# Patient Record
(demographics unavailable — no encounter records)

---

## 2024-10-23 NOTE — HISTORY OF PRESENT ILLNESS
[FreeTextEntry8] : patient presents as over last few weeks has been experiencing unexplained vomiting. there has been no change in diet and medication, patient has been pretty calm with no stress at all. patient also will feel nauseous at times. Patient also experiences dizziness at times and blood pressure has been low when taking it at home. 110/59 was taken at home and patient considers that low. 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 he ate wendys and 5 mins before the test he vomited last friday he went to get food with his friends and he had Portuguese food and he ate the same food as them and his stomach wasnt feeling great and he had nausea, he went for a walk and he felt naseous but didnt vomit  denies blood he vomited again monday after brushing his teeth denies abdominal pain , diarrhea or constipation, fevers or chills  he has more nausea than vomiting  he has felt calm during exams   he stopped going to the gym bc he doesnt know if stress from gym is causing cortisol to be elevated he stopped protein powder and creatine

## 2024-10-23 NOTE — HISTORY OF PRESENT ILLNESS
[FreeTextEntry8] : patient presents as over last few weeks has been experiencing unexplained vomiting. there has been no change in diet and medication, patient has been pretty calm with no stress at all. patient also will feel nauseous at times. Patient also experiences dizziness at times and blood pressure has been low when taking it at home. 110/59 was taken at home and patient considers that low. 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 he ate wendys and 5 mins before the test he vomited last friday he went to get food with his friends and he had [place]ien food and he ate the same food as them and his stomach wasnt feeling great and he had nausea, he went for a walk and he felt naseous but didnt vomit  denies blood he vomited again monday after brushing his teeth denies abdominal pain , diarrhea or constipation, fevers or chills  he has more nausea than vomiting  he has felt calm during exams   he stopped going to the gym bc he doesnt know if stress from gym is causing cortisol to be elevated he stopped protein powder and creatine

## 2024-10-23 NOTE — REVIEW OF SYSTEMS
[Fever] : no fever [Chills] : no chills [Recent Change In Weight] : ~T no recent weight change [Abdominal Pain] : no abdominal pain [Nausea] : nausea [Constipation] : no constipation [Diarrhea] : no diarrhea [Vomiting] : vomiting [Heartburn] : no heartburn [Melena] : no melena

## 2024-11-25 NOTE — HISTORY OF PRESENT ILLNESS
[FreeTextEntry8] : Patient with worsening throat pain, difficulty swallowing, enlarged glands, exudates on tonsils x 5 days.  Patient went to Urgent care twice. First time he was given zpack and took for 3 days. Second time he was given doxycycline and prednisone which he is started yesterday. He feels like medications not helping. Also taking Tylenol and ibuprofen with no relief.  No testing for strep was performed.

## 2024-11-25 NOTE — PHYSICAL EXAM
[No Acute Distress] : no acute distress [Ill-Appearing] : ill-appearing [Normal] : normal rate, regular rhythm, normal S1 and S2 and no murmur heard [Normal Affect] : the affect was normal [Alert and Oriented x3] : oriented to person, place, and time [Normal Insight/Judgement] : insight and judgment were intact [de-identified] : +bilateral tonsil swelling, erythema. +right tonsil exudates . +enlarged tonsillar glands

## 2024-11-25 NOTE — ASSESSMENT
[FreeTextEntry1] : Tonsilitis possible secondary to either mono or streptococcus. Rapid strep is negative. Strep PCR obtained. COVID/FLU RSV swab obtained. CBC and Kathy barre antibodies to r/u acute mono. Will switch doxy to clarithromycin for now. Reports severe allergy to PCN. May ube Tylenol or ibuprofen for pain/swelling. Follow up in 7 days. Advised patient if he has any severe difficulty swallowing, drooling, difficulty breathing then go to ER.

## 2024-11-25 NOTE — REVIEW OF SYSTEMS
[Fever] : no fever [Fatigue] : fatigue [Sore Throat] : sore throat [Chest Pain] : no chest pain [Palpitations] : no palpitations [Shortness Of Breath] : no shortness of breath [Wheezing] : no wheezing [Cough] : no cough [Abdominal Pain] : no abdominal pain [Nausea] : no nausea [Diarrhea] : no diarrhea [Swollen Glands] : swollen glands

## 2024-12-02 NOTE — HISTORY OF PRESENT ILLNESS
[FreeTextEntry1] : pt presents for med follow up  [de-identified] : ALYSA BRYANT is a 23-year male who presents today Dec 02, 2024 for mononucleosis follow up. He states he is feeling 99% better. Has been taking a lot of Tylenol. He had CBC which was normal but would like CMP to check kidney and liver.

## 2024-12-02 NOTE — PHYSICAL EXAM
[Normal] : no acute distress, well nourished, well developed and well-appearing [Soft] : abdomen soft [Non Tender] : non-tender [Non-distended] : non-distended [No HSM] : no HSM [de-identified] : +pharynx still erythematous. Tonsils much less swollen

## 2024-12-02 NOTE — ASSESSMENT
[FreeTextEntry1] : Acute Mono - Symptoms improved. Advised to contact sports or heavy lifting x 4 weeks.  - Will draw CMP today to ensure no liver abnormalities.